# Patient Record
Sex: MALE | Race: ASIAN | URBAN - METROPOLITAN AREA
[De-identification: names, ages, dates, MRNs, and addresses within clinical notes are randomized per-mention and may not be internally consistent; named-entity substitution may affect disease eponyms.]

---

## 2020-11-13 ENCOUNTER — OFFICE VISIT (OUTPATIENT)
Dept: URBAN - METROPOLITAN AREA CLINIC 96 | Facility: CLINIC | Age: 50
End: 2020-11-13
Payer: COMMERCIAL

## 2020-11-13 ENCOUNTER — WEB ENCOUNTER (OUTPATIENT)
Dept: URBAN - METROPOLITAN AREA CLINIC 96 | Facility: CLINIC | Age: 50
End: 2020-11-13

## 2020-11-13 VITALS
TEMPERATURE: 97.5 F | BODY MASS INDEX: 31.23 KG/M2 | SYSTOLIC BLOOD PRESSURE: 138 MMHG | DIASTOLIC BLOOD PRESSURE: 94 MMHG | HEART RATE: 89 BPM | HEIGHT: 67 IN | WEIGHT: 199 LBS

## 2020-11-13 DIAGNOSIS — K80.20 CHOLELITHIASES: ICD-10-CM

## 2020-11-13 DIAGNOSIS — R19.8 STRAINING DURING BOWEL MOVEMENTS: ICD-10-CM

## 2020-11-13 DIAGNOSIS — K76.0 FATTY LIVER: ICD-10-CM

## 2020-11-13 DIAGNOSIS — E66.9 OBESITY: ICD-10-CM

## 2020-11-13 DIAGNOSIS — K59.01 CONSTIPATION: ICD-10-CM

## 2020-11-13 DIAGNOSIS — K64.8 HEMORRHOID PROLAPSE: ICD-10-CM

## 2020-11-13 PROBLEM — 266474003 CHOLELITHIASIS: Status: ACTIVE | Noted: 2020-11-13

## 2020-11-13 PROBLEM — 197321007 FATTY LIVER: Status: ACTIVE | Noted: 2020-11-13

## 2020-11-13 PROBLEM — 414916001 OBESITY: Status: ACTIVE | Noted: 2020-11-13

## 2020-11-13 PROBLEM — 14760008 CONSTIPATION: Status: ACTIVE | Noted: 2020-11-13

## 2020-11-13 PROCEDURE — 99213 OFFICE O/P EST LOW 20 MIN: CPT | Performed by: INTERNAL MEDICINE

## 2020-11-13 NOTE — HPI-OTHER HISTORIES
49 y.o.  Definitely have problems going to the pooper Could be related to diet  Don't drink enough water nor have enough fiber in diet Constipation is so bad that has straining leading to bleeding Can be drops in water or wiping on t.p. Not pouring out Last few days, washing anus in shower, felt a bubble Wife took a look - told hemorrhoid Started on Psyllium husk for a few days, but not regular w/ it Bowels are every 2-3 days Lots of gas

## 2020-11-13 NOTE — PHYSICAL EXAM GASTROINTESTINAL
Abdomen , soft, nontender, nondistended , no guarding or rigidity , no masses palpable , normal bowel sounds , Liver and Spleen , no hepatomegaly present , no hepatosplenomegaly , liver nontender , spleen not palpable , Rectal , normal sphincter tone; no ext hemorrhoids; small internal hemorrhoids; no rectal masses or bleeding present

## 2022-01-07 ENCOUNTER — OFFICE VISIT (OUTPATIENT)
Dept: URBAN - METROPOLITAN AREA CLINIC 96 | Facility: CLINIC | Age: 52
End: 2022-01-07

## 2022-02-25 ENCOUNTER — OFFICE VISIT (OUTPATIENT)
Dept: URBAN - METROPOLITAN AREA CLINIC 96 | Facility: CLINIC | Age: 52
End: 2022-02-25

## 2022-04-15 ENCOUNTER — OFFICE VISIT (OUTPATIENT)
Dept: URBAN - METROPOLITAN AREA CLINIC 96 | Facility: CLINIC | Age: 52
End: 2022-04-15

## 2022-06-03 ENCOUNTER — OFFICE VISIT (OUTPATIENT)
Dept: URBAN - METROPOLITAN AREA CLINIC 96 | Facility: CLINIC | Age: 52
End: 2022-06-03
Payer: COMMERCIAL

## 2022-06-03 ENCOUNTER — WEB ENCOUNTER (OUTPATIENT)
Dept: URBAN - METROPOLITAN AREA CLINIC 96 | Facility: CLINIC | Age: 52
End: 2022-06-03

## 2022-06-03 VITALS
WEIGHT: 187 LBS | SYSTOLIC BLOOD PRESSURE: 123 MMHG | BODY MASS INDEX: 29.35 KG/M2 | TEMPERATURE: 97.9 F | HEIGHT: 67 IN | HEART RATE: 78 BPM | DIASTOLIC BLOOD PRESSURE: 78 MMHG

## 2022-06-03 DIAGNOSIS — E66.3 OVERWEIGHT (BMI 25.0-29.9): ICD-10-CM

## 2022-06-03 DIAGNOSIS — K59.00 CONSTIPATION, UNSPECIFIED CONSTIPATION TYPE: ICD-10-CM

## 2022-06-03 DIAGNOSIS — E88.89 STEATOSIS: ICD-10-CM

## 2022-06-03 PROBLEM — 14760008: Status: ACTIVE | Noted: 2022-06-03

## 2022-06-03 PROCEDURE — 99213 OFFICE O/P EST LOW 20 MIN: CPT | Performed by: INTERNAL MEDICINE

## 2022-06-03 NOTE — PHYSICAL EXAM GASTROINTESTINAL
Abdomen , soft, nontender, nondistended , no guarding or rigidity , no masses palpable , normal bowel sounds , Liver and Spleen , no hepatomegaly present , no hepatosplenomegaly , liver nontender , spleen not palpable  neg perianal exam

## 2022-06-03 NOTE — HPI-TODAY'S VISIT:
51 y.o.  Seen 1.5 yrs ago Been dx w/ diabetes Lost weight - stopped metformin - dx A1c 10, down to 5 now Bowels every 3 days  Can be hard to start, but, when gets going, goes well Drinks more water No blood in stool; rarely on t.p. Its the number of times he goes No abd pain Reviewd on pt's phone from March 2022 - OMAR JOYNER

## 2022-06-04 LAB
BASO (ABSOLUTE): 0
BASOS: 1
EOS (ABSOLUTE): 0.1
EOS: 2
HEMATOCRIT: 43.5
HEMATOLOGY COMMENTS:: (no result)
HEMOGLOBIN: 14.4
IMMATURE CELLS: (no result)
IMMATURE GRANS (ABS): 0
IMMATURE GRANULOCYTES: 1
LYMPHS (ABSOLUTE): 3.2
LYMPHS: 44
MCH: 28.7
MCHC: 33.1
MCV: 87
MONOCYTES(ABSOLUTE): 0.5
MONOCYTES: 6
NEUTROPHILS (ABSOLUTE): 3.5
NEUTROPHILS: 46
NRBC: (no result)
PLATELETS: 231
RBC: 5.01
RDW: 12.6
T4,FREE(DIRECT): 1.38
TSH: 0.86
WBC: 7.4

## 2023-10-05 ENCOUNTER — TELEPHONE ENCOUNTER (OUTPATIENT)
Dept: URBAN - METROPOLITAN AREA CLINIC 96 | Facility: CLINIC | Age: 53
End: 2023-10-05

## 2023-10-06 ENCOUNTER — OFFICE VISIT (OUTPATIENT)
Dept: URBAN - METROPOLITAN AREA CLINIC 96 | Facility: CLINIC | Age: 53
End: 2023-10-06
Payer: COMMERCIAL

## 2023-10-06 ENCOUNTER — WEB ENCOUNTER (OUTPATIENT)
Dept: URBAN - METROPOLITAN AREA CLINIC 96 | Facility: CLINIC | Age: 53
End: 2023-10-06

## 2023-10-06 ENCOUNTER — LAB OUTSIDE AN ENCOUNTER (OUTPATIENT)
Dept: URBAN - METROPOLITAN AREA CLINIC 96 | Facility: CLINIC | Age: 53
End: 2023-10-06

## 2023-10-06 VITALS
BODY MASS INDEX: 29.82 KG/M2 | SYSTOLIC BLOOD PRESSURE: 118 MMHG | WEIGHT: 190 LBS | DIASTOLIC BLOOD PRESSURE: 81 MMHG | HEIGHT: 67 IN | TEMPERATURE: 98.2 F

## 2023-10-06 DIAGNOSIS — K62.5 RECTAL BLEEDING: ICD-10-CM

## 2023-10-06 DIAGNOSIS — K59.01 CONSTIPATION: ICD-10-CM

## 2023-10-06 DIAGNOSIS — K64.8 INTERNAL HEMORRHOID: ICD-10-CM

## 2023-10-06 PROCEDURE — 99214 OFFICE O/P EST MOD 30 MIN: CPT

## 2023-10-06 RX ORDER — SODIUM, POTASSIUM,MAG SULFATES 17.5-3.13G
177 ML SOLUTION, RECONSTITUTED, ORAL ORAL
Qty: 1 KIT | Refills: 0 | OUTPATIENT
Start: 2023-10-06 | End: 2023-10-07

## 2023-10-06 NOTE — HPI-TODAY'S VISIT:
Patient is a 52-year-old male who presents to discuss blood in stool.   Last colonoscopy completed 1/14/2019 also for hematochezia.  Findings included hemorrhoids on perianal exam.  Normal-appearing ileum.  Erythematous and linear ulcerated mucosa in the rectum.  Examination was otherwise normal.  Pathology results revealed colonic mucosa with no significant pathologic abnormality.   Of note also had an upper endoscopy 1/14/2019.  Findings included LA grade a reflux esophagitis.  Erythematous mucosa in the antrum.  Normal-appearing duodenum.  Pathology results revealed minimal chronic inactive gastritis from random stomach biopsy with no H. pylori.  GE junction mild chronic inflammation suggestive of reflux. 3 week history of rectal bleeding and constipation  Constipation is chronic problem per patient  BRBPR in toilet and on stool- can also see with wiping  Denies melena  Having a BM every 4-5 days- occurs with BMs every time  Episodes of constipation with hard stools- diarrhea afterwards Has tried psyllium as well as Miralax with no improvement He is wanting to go ahead and proceed with colonoscopy as told in 5 years

## 2023-10-06 NOTE — PHYSICAL EXAM GASTROINTESTINAL
Abdomen , soft, nontender, nondistended , no guarding or rigidity , no masses palpable , Rectal no external hemorrhoids, no rectal bleeding or masses

## 2023-10-07 ENCOUNTER — WEB ENCOUNTER (OUTPATIENT)
Dept: URBAN - METROPOLITAN AREA CLINIC 96 | Facility: CLINIC | Age: 53
End: 2023-10-07

## 2023-10-27 ENCOUNTER — OFFICE VISIT (OUTPATIENT)
Dept: URBAN - METROPOLITAN AREA TELEHEALTH 2 | Facility: TELEHEALTH | Age: 53
End: 2023-10-27

## 2023-11-10 ENCOUNTER — WEB ENCOUNTER (OUTPATIENT)
Dept: URBAN - METROPOLITAN AREA CLINIC 96 | Facility: CLINIC | Age: 53
End: 2023-11-10

## 2023-12-01 ENCOUNTER — OFFICE VISIT (OUTPATIENT)
Dept: URBAN - METROPOLITAN AREA CLINIC 96 | Facility: CLINIC | Age: 53
End: 2023-12-01

## 2023-12-19 ENCOUNTER — CLAIMS CREATED FROM THE CLAIM WINDOW (OUTPATIENT)
Dept: URBAN - METROPOLITAN AREA SURGERY CENTER 18 | Facility: SURGERY CENTER | Age: 53
End: 2023-12-19
Payer: COMMERCIAL

## 2023-12-19 ENCOUNTER — DASHBOARD ENCOUNTERS (OUTPATIENT)
Age: 53
End: 2023-12-19

## 2023-12-19 ENCOUNTER — OFFICE VISIT (OUTPATIENT)
Dept: URBAN - METROPOLITAN AREA SURGERY CENTER 18 | Facility: SURGERY CENTER | Age: 53
End: 2023-12-19

## 2023-12-19 DIAGNOSIS — K62.5 ANAL BLEEDING: ICD-10-CM

## 2023-12-19 DIAGNOSIS — K62.5 RECTAL BLEEDING: ICD-10-CM

## 2023-12-19 DIAGNOSIS — K59.00 CONSTIPATION: ICD-10-CM

## 2023-12-19 DIAGNOSIS — K64.8 HEMORRHOIDS, INTERNAL. NON-BLEEDING: ICD-10-CM

## 2023-12-19 PROCEDURE — 00811 ANES LWR INTST NDSC NOS: CPT | Performed by: NURSE ANESTHETIST, CERTIFIED REGISTERED

## 2023-12-19 PROCEDURE — 45378 DIAGNOSTIC COLONOSCOPY: CPT | Performed by: INTERNAL MEDICINE

## 2025-04-28 ENCOUNTER — TELEPHONE ENCOUNTER (OUTPATIENT)
Dept: URBAN - METROPOLITAN AREA CLINIC 92 | Facility: CLINIC | Age: 55
End: 2025-04-28

## 2025-04-28 ENCOUNTER — OFFICE VISIT (OUTPATIENT)
Dept: URBAN - METROPOLITAN AREA CLINIC 96 | Facility: CLINIC | Age: 55
End: 2025-04-28
Payer: COMMERCIAL

## 2025-04-28 ENCOUNTER — WEB ENCOUNTER (OUTPATIENT)
Dept: URBAN - METROPOLITAN AREA CLINIC 96 | Facility: CLINIC | Age: 55
End: 2025-04-28

## 2025-04-28 DIAGNOSIS — E88.810 METABOLIC SYNDROME: ICD-10-CM

## 2025-04-28 DIAGNOSIS — K76.0 HEPATIC STEATOSIS: ICD-10-CM

## 2025-04-28 DIAGNOSIS — R14.0 ABDOMINAL BLOATING: ICD-10-CM

## 2025-04-28 DIAGNOSIS — K59.1 FUNCTIONAL DIARRHEA: ICD-10-CM

## 2025-04-28 DIAGNOSIS — R15.2 DEFECATION URGENCY: ICD-10-CM

## 2025-04-28 PROBLEM — 237602007: Status: ACTIVE | Noted: 2025-04-28

## 2025-04-28 PROBLEM — 197321007: Status: ACTIVE | Noted: 2025-04-28

## 2025-04-28 PROCEDURE — 99214 OFFICE O/P EST MOD 30 MIN: CPT | Performed by: INTERNAL MEDICINE

## 2025-04-28 RX ORDER — RIFAXIMIN 550 MG/1
1 TABLET TABLET ORAL TWICE A DAY
Qty: 42 TABLET | Refills: 0 | OUTPATIENT
Start: 2025-04-28 | End: 2025-05-28

## 2025-04-28 NOTE — HPI-TODAY'S VISIT:
53 yo male Not seen in over a year Started taking Ozempic 12/24 2/2 diabetes Since mid-March, have had urgent bowels Having no bowels for day, then major poop, then diarrhea for 2-3 days After eating, has to rush to bathroom Has had twinges of pain in both upper quadrants of abd Some straining w/ bowels w/ rare blood on t.p. Bloating and gas is real bad  Recent physical - [place] - 4/25 - labs reviewed - JUAN A1c down from 6/9 to 6.2  Accident 12/24 - CT - brusing on stomach

## 2025-04-30 ENCOUNTER — WEB ENCOUNTER (OUTPATIENT)
Dept: URBAN - METROPOLITAN AREA CLINIC 98 | Facility: CLINIC | Age: 55
End: 2025-04-30

## 2025-06-04 ENCOUNTER — OFFICE VISIT (OUTPATIENT)
Dept: URBAN - METROPOLITAN AREA CLINIC 97 | Facility: CLINIC | Age: 55
End: 2025-06-04

## 2025-06-17 ENCOUNTER — OFFICE VISIT (OUTPATIENT)
Dept: URBAN - METROPOLITAN AREA TELEHEALTH 2 | Facility: TELEHEALTH | Age: 55
End: 2025-06-17